# Patient Record
Sex: MALE | Race: WHITE
[De-identification: names, ages, dates, MRNs, and addresses within clinical notes are randomized per-mention and may not be internally consistent; named-entity substitution may affect disease eponyms.]

---

## 2020-01-28 LAB
ANISOCYTOSIS BLD QL: (no result)
BLD SMEAR INTERP: (no result)
BUN BLD-MCNC: 18 MG/DL (ref 7–18)
GLUCOSE SERPLBLD-MCNC: 94 MG/DL (ref 74–106)
HCT VFR BLD CALC: 37.7 % (ref 39.6–49)
LYMPHOCYTES # SPEC AUTO: 3 K/UL (ref 0.7–4.9)
MORPHOLOGY BLD-IMP: (no result)
PMV BLD: 9 FL (ref 7.6–11.3)
POIKILOCYTOSIS BLD QL SMEAR: (no result)
POTASSIUM SERPL-SCNC: 4.4 MMOL/L (ref 3.5–5.1)
RBC # BLD: 5.02 M/UL (ref 4.33–5.43)

## 2020-01-28 NOTE — RAD REPORT
EXAM DESCRIPTION:  RAD - Chest Pa And Lat (2 Views) - 1/28/2020 4:33 pm

 

CLINICAL HISTORY:  preop, patient pending hernia repair, prior colon resection, smoking history

 

COMPARISON:  CHEST PA AND LAT 2 VIEW dated 1/19/2012

 

TECHNIQUE:  Frontal and lateral views of the chest were obtained.

 

FINDINGS:  The lungs are clear of a focal mass or infiltrate. Interstitial pattern matches comparison
.   Heart size is normal and central vasculature is within normal limits.  No pleural effusion or pne
umothorax seen.  No acute bony finding noted.  No aortic abnormality.

 

IMPRESSION:  No acute cardiopulmonary process.

## 2020-01-29 ENCOUNTER — HOSPITAL ENCOUNTER (INPATIENT)
Dept: HOSPITAL 97 - OR | Age: 60
LOS: 2 days | Discharge: HOME | DRG: 337 | End: 2020-01-31
Attending: SURGERY | Admitting: SURGERY
Payer: COMMERCIAL

## 2020-01-29 VITALS — BODY MASS INDEX: 40.6 KG/M2

## 2020-01-29 DIAGNOSIS — K43.0: Primary | ICD-10-CM

## 2020-01-29 DIAGNOSIS — F17.210: ICD-10-CM

## 2020-01-29 DIAGNOSIS — K66.0: ICD-10-CM

## 2020-01-29 DIAGNOSIS — Z98.84: ICD-10-CM

## 2020-01-29 PROCEDURE — 97116 GAIT TRAINING THERAPY: CPT

## 2020-01-29 PROCEDURE — 85025 COMPLETE CBC W/AUTO DIFF WBC: CPT

## 2020-01-29 PROCEDURE — 0WUF0JZ SUPPLEMENT ABDOMINAL WALL WITH SYNTHETIC SUBSTITUTE, OPEN APPROACH: ICD-10-PCS

## 2020-01-29 PROCEDURE — 0DN80ZZ RELEASE SMALL INTESTINE, OPEN APPROACH: ICD-10-PCS

## 2020-01-29 PROCEDURE — 80048 BASIC METABOLIC PNL TOTAL CA: CPT

## 2020-01-29 PROCEDURE — 71046 X-RAY EXAM CHEST 2 VIEWS: CPT

## 2020-01-29 PROCEDURE — 36415 COLL VENOUS BLD VENIPUNCTURE: CPT

## 2020-01-29 PROCEDURE — 88302 TISSUE EXAM BY PATHOLOGIST: CPT

## 2020-01-29 RX ADMIN — CEFOXITIN SODIUM SCH MLS: 1 INJECTION, SOLUTION INTRAVENOUS at 13:05

## 2020-01-29 RX ADMIN — SODIUM CHLORIDE SCH MLS: 0.9 INJECTION, SOLUTION INTRAVENOUS at 13:04

## 2020-01-29 RX ADMIN — HYDROMORPHONE HYDROCHLORIDE ONE MG: 2 INJECTION INTRAMUSCULAR; INTRAVENOUS; SUBCUTANEOUS at 12:34

## 2020-01-29 RX ADMIN — HYDROMORPHONE HYDROCHLORIDE ONE MG: 2 INJECTION INTRAMUSCULAR; INTRAVENOUS; SUBCUTANEOUS at 12:24

## 2020-01-29 RX ADMIN — SODIUM CHLORIDE SCH MLS: 0.9 INJECTION, SOLUTION INTRAVENOUS at 21:59

## 2020-01-29 RX ADMIN — HYDROMORPHONE HYDROCHLORIDE PRN MG: 1 INJECTION, SOLUTION INTRAMUSCULAR; INTRAVENOUS; SUBCUTANEOUS at 18:44

## 2020-01-29 RX ADMIN — CEFOXITIN SODIUM SCH MLS: 1 INJECTION, SOLUTION INTRAVENOUS at 18:43

## 2020-01-29 RX ADMIN — HYDROMORPHONE HYDROCHLORIDE PRN MG: 1 INJECTION, SOLUTION INTRAMUSCULAR; INTRAVENOUS; SUBCUTANEOUS at 22:06

## 2020-01-29 RX ADMIN — HYDROMORPHONE HYDROCHLORIDE ONE MG: 2 INJECTION INTRAMUSCULAR; INTRAVENOUS; SUBCUTANEOUS at 12:16

## 2020-01-29 RX ADMIN — HYDROMORPHONE HYDROCHLORIDE ONE MG: 2 INJECTION INTRAMUSCULAR; INTRAVENOUS; SUBCUTANEOUS at 12:19

## 2020-01-29 NOTE — P.BOP
Preoperative diagnosis: incisional ventral hernia, hx of ostomy


Postoperative diagnosis: same incarcerated incisional ventral hernia


Primary procedure: Exploratory laparotomy, Extensive intrabdominal adhesions


Secondary procedure: Open repair of large incisional ventral hernia with mesh 

lap assisted


Assistant: Kita Ferrera (Michelle)


Estimated blood loss: <50cc


Specimen: hernia sac


Findings: incarcerated omentum


Anesthesia: General


Complications: None


Implants: ventralight ST with echo ps 15.2 x 20.3cm


Transferred to: Recovery Room


Condition: Good

## 2020-01-30 VITALS — OXYGEN SATURATION: 92 %

## 2020-01-30 LAB
BUN BLD-MCNC: 14 MG/DL (ref 7–18)
GLUCOSE SERPLBLD-MCNC: 100 MG/DL (ref 74–106)
HCT VFR BLD CALC: 32.8 % (ref 39.6–49)
LYMPHOCYTES # SPEC AUTO: 1.7 K/UL (ref 0.7–4.9)
PMV BLD: 9.2 FL (ref 7.6–11.3)
POTASSIUM SERPL-SCNC: 4.2 MMOL/L (ref 3.5–5.1)
RBC # BLD: 4.36 M/UL (ref 4.33–5.43)

## 2020-01-30 RX ADMIN — HYDROCODONE BITARTRATE AND ACETAMINOPHEN PRN TAB: 7.5; 325 TABLET ORAL at 12:06

## 2020-01-30 RX ADMIN — CEFOXITIN SODIUM SCH MLS: 1 INJECTION, SOLUTION INTRAVENOUS at 00:54

## 2020-01-30 RX ADMIN — HYDROMORPHONE HYDROCHLORIDE PRN MG: 1 INJECTION, SOLUTION INTRAMUSCULAR; INTRAVENOUS; SUBCUTANEOUS at 17:02

## 2020-01-30 RX ADMIN — HYDROMORPHONE HYDROCHLORIDE PRN MG: 1 INJECTION, SOLUTION INTRAMUSCULAR; INTRAVENOUS; SUBCUTANEOUS at 13:56

## 2020-01-30 RX ADMIN — HYDROMORPHONE HYDROCHLORIDE PRN MG: 1 INJECTION, SOLUTION INTRAMUSCULAR; INTRAVENOUS; SUBCUTANEOUS at 09:22

## 2020-01-30 RX ADMIN — HYDROMORPHONE HYDROCHLORIDE PRN MG: 1 INJECTION, SOLUTION INTRAMUSCULAR; INTRAVENOUS; SUBCUTANEOUS at 01:03

## 2020-01-30 RX ADMIN — HYDROCODONE BITARTRATE AND ACETAMINOPHEN PRN TAB: 7.5; 325 TABLET ORAL at 06:30

## 2020-01-30 RX ADMIN — SODIUM CHLORIDE SCH: 0.9 INJECTION, SOLUTION INTRAVENOUS at 18:00

## 2020-01-30 RX ADMIN — HYDROMORPHONE HYDROCHLORIDE PRN MG: 1 INJECTION, SOLUTION INTRAMUSCULAR; INTRAVENOUS; SUBCUTANEOUS at 05:18

## 2020-01-30 RX ADMIN — SODIUM CHLORIDE SCH MLS: 0.9 INJECTION, SOLUTION INTRAVENOUS at 08:00

## 2020-01-30 RX ADMIN — SODIUM CHLORIDE SCH MG: 0.9 INJECTION, SOLUTION INTRAVENOUS at 09:14

## 2020-01-30 RX ADMIN — SODIUM CHLORIDE SCH MLS: 0.9 INJECTION, SOLUTION INTRAVENOUS at 20:28

## 2020-01-30 RX ADMIN — HYDROCODONE BITARTRATE AND ACETAMINOPHEN PRN TAB: 7.5; 325 TABLET ORAL at 20:35

## 2020-01-30 NOTE — PN
Date of Progress Note:  01/30/2020



Subjective:  Status post laparotomy and open repair of an incisional incarcerated ventral hernia with
 mesh __________ lysis of adhesions.  Patient is doing better.  Still not tolerating diet.  _________
_ ambulate.  Afebrile.  No shortness of breath.  No chest pain.



Objective:  Chest:  Clear. 

Abdomen:  Intact surgical site. 

Extremities:  Good capillary refill.  No calf tenderness.



Plan:  Continue pain control __________ requiring IV medication, abdominal binder, ambulation with PT
, incentive spirometry.  We are going to advanced diet slowly.





HM/MODL

DD:  01/30/2020 11:44:18Voice ID:  [id number]

DT:  01/30/2020 16:02:32Report ID:  [id number]

## 2020-01-31 VITALS — SYSTOLIC BLOOD PRESSURE: 140 MMHG | DIASTOLIC BLOOD PRESSURE: 78 MMHG

## 2020-01-31 VITALS — TEMPERATURE: 99 F

## 2020-01-31 RX ADMIN — HYDROCODONE BITARTRATE AND ACETAMINOPHEN PRN TAB: 7.5; 325 TABLET ORAL at 08:42

## 2020-01-31 RX ADMIN — SODIUM CHLORIDE SCH MG: 0.9 INJECTION, SOLUTION INTRAVENOUS at 08:43

## 2020-01-31 RX ADMIN — HYDROCODONE BITARTRATE AND ACETAMINOPHEN PRN TAB: 7.5; 325 TABLET ORAL at 12:23

## 2020-01-31 RX ADMIN — SODIUM CHLORIDE SCH MLS: 0.9 INJECTION, SOLUTION INTRAVENOUS at 06:52

## 2020-01-31 RX ADMIN — HYDROCODONE BITARTRATE AND ACETAMINOPHEN PRN TAB: 7.5; 325 TABLET ORAL at 03:31

## 2020-01-31 NOTE — P.DS
Admission Date: 01/30/20


Discharge Date: 01/31/20


Disposition: ROUTINE DISCHARGE


Discharge Condition: GOOD


Vital Signs/Physical Exam: 














Temp Pulse Resp BP Pulse Ox


 


 99.4 F   66   18   134/78   98 


 


 01/31/20 08:00  01/31/20 08:00  01/31/20 08:42  01/31/20 08:00  01/31/20 08:42








General: Alert, Oriented x3, Cooperative


HEENT: PERRLA, EOMI


Neck: Supple


Respiratory: Normal air movement


Cardiovascular: No edema


Gastrointestinal: Soft and benign


Musculoskeletal: No erythema, No tenderness, No warmth


Integumentary: No rashes, No breakdown


Neurological: Normal speech


Laboratory Data at Discharge: 














WBC  7.4 K/uL (4.3-10.9)   01/30/20  05:06    


 


Hgb  10.6 g/dL (13.6-17.9)  L  01/30/20  05:06    


 


Hct  32.8 % (39.6-49.0)  L  01/30/20  05:06    


 


Plt Count  204 K/uL (152-406)   01/30/20  05:06    


 


Sodium  139 mmol/L (136-145)   01/30/20  05:06    


 


Potassium  4.2 mmol/L (3.5-5.1)   01/30/20  05:06    


 


BUN  14 mg/dL (7-18)   01/30/20  05:06    


 


Creatinine  0.97 mg/dL (0.55-1.3)   01/30/20  05:06    


 


Glucose  100 mg/dL ()   01/30/20  05:06    








Home Medications: 








Apixaban [Eliquis] 5 mg PO BID 01/28/20 


Cholecalciferol (Vitamin D3) [Vitamin D3] 1,000 unit PO DAILY 01/28/20 


Cyanocobalamin (Vitamin B-12) [Vitamin B12] 2,500 mcg PO DAILY 01/28/20 


L.acidoph,Paracasei, B.lactis [Probiotic] 2 tab PO DAILY 01/28/20 


Magnesium Oxide [Mag 0X Tab] 400 mg PO DAILY 01/28/20 


Hydrocodone 7.5/APAP 325 [Norco 7.5/325 mg*] 1 tab PO Q4H PRN #30 tab 01/31/20 


Ondansetron HCl [Zofran] 4 mg PO Q6H PRN #10 tablet 01/31/20 


Sulfamethoxazole/Trimethoprim [Bactrim Ds Tablet] 1 each PO BID #10 tablet 01/31 /20 





New Medications: 


Hydrocodone 7.5/APAP 325 [Norco 7.5/325 mg*] 1 tab PO Q4H PRN #30 tab


 PRN Reason: Pain Scale 5-7 (Moderate)


Ondansetron HCl [Zofran] 4 mg PO Q6H PRN #10 tablet


 PRN Reason: Nausea / Vomiting


Sulfamethoxazole/Trimethoprim [Bactrim Ds Tablet] 1 each PO BID #10 tablet


Patient Discharge Instructions: MAy take showers with dressing off.  Abdominal 

binder


Diet: AHA


Activity: No lifting more than 10 lbs


Followup: 


Abundio Talley MD [ACTIVE - CAN ADMIT] - 1 Week

## 2020-02-01 NOTE — OP
Date of Procedure:  01/29/2020



Surgeon:  Abundio Talley MD



Assistant:  THOMAS Marino.



Preoperative Diagnoses:  Incisional ventral hernia, history of bowel resection and complicated surger
y with history of ileostomy.



Postoperative Diagnoses:  Incarcerated incisional ventral hernia, history of bowel resection and comp
licated surgery with history of ileostomy.



Procedures:  Exploratory laparotomy, extensive intra-abdominal adhesions, open repair of large incisi
onal ventral hernia with mesh, laparoscopic assist.



Specimen:  Hernia sac.



Findings:  Incarcerated omentum.  Large ventral defect.



Anesthesia:  General plus local.



Indications:  This is the case of a 59-year-old patient, who had extensive intra-abdominal surgeries 
with multiple complication as per patient.  They were not done in this institution.  He ended up with
 also a right side loop ileostomy that eventually was reversed by his doctors.  Now, he comes with a 
hernia on the ostomy site, tender.  He wants that repaired.  The benefit, alternatives, risks of repa
ir of the incisional large ventral hernia with mesh with laparoscopic assist was fully explained to t
he patient, which include, but are not limited to infection, bleeding, damage to adjacent structures,
 anesthesia complication, recurrence, MI, and even death.  He also understands this may not relieve a
ny symptoms, he might need more than one surgical intervention.  He understands the importance of los
ing weight and avoiding trauma to the area.  He is understanding we will have intention to use mesh i
n this place if needed and mesh pros and cons were explained to the patient with all the questions an
swered to his satisfaction and he did consent for mesh.  We also explained to him we may encounter so
me adhesions that area; that if extensive, may have to stay overnight if that is the case and may nee
d to do a formal laparotomy.  He understood all that.  Under those conditions, then he signed the con
sent.



Description Of Procedure:  Patient was brought to the operating room, placed in supine position.  Ane
sthesia was achieved without complication.  Abdominal area was prepped and draped in sterile fashion.
  Local anesthesia was applied.  We made an incision over the previous area what we felt the hernia. 
 We were able to go all the way down to find the hernia sac.  We were able to notice the defects pres
ent.  Once we had a Sruthi trocar over the area, we obtained the pneumoperitoneum.  We noticed the ex
tensive intra-abdominal adhesions.  In order for me to continue to be able to repair this, I had to d
o 2 things.  I had to close the defect and most likely it was going to have to do open since the fasc
ial edges had to be  and had to be cleaned, but in fact removed all this adhesions from that
 area and dropped the bowels that was attached to the anterior abdominal wall down without injury.  T
hen, we proceeded with the surgery and that is what we did.  We put 5 mm trocars in the sites of the 
abdomen and carefully we did extensive lysis of adhesions.  It took about an hour just to do adhesion
 itself.  This was done with the help of LigaSure with no enterotomies.  Once we had the specific siz
e of the defect, we saw clearly now.  Then, I proceeded to open skin where that area was and then rem
jj the hernia sac, cleaned the fascial edges.  We reduced the subcutaneous tissue with scar tissue 
from around the area to allowed me to put this fascia together, but we had to use a mesh underneath t
o reinforce and that will be done laparoscopic again.  So, I cleaned the fascial edges, removed the h
ernia sac, removed the omentum previously from that area, and then I proceeded to close the defects w
ith nylon stitches figure-of-eight interrupted.  Once we had all them together before we tied those, 
we dropped a Ventralight ST mesh 15 x 20 cm to at least overlap the defect by about 3 to 5 cm.  Then,
 we tied all the stitches except 1 so we do not entrap the inflation tubing.  At that moment, we obta
ined pneumoperitoneum once again.  Checked the area of the previous lysis of adhesions, no enterotomi
es, no bleeding.  Omental was intact.  At that moment, then I proceeded to insufflate the balloon and
 we indeed when we did that the mesh looked nice and flat against the abdominal wall with no bowel in
 between.  We used a capsule fixation device to secure that area.  We used at least 2 of those device
s just to secure that in place and make sure there is no intestines coming in between.  We noticed al
so that already we had airproof conditions since no air did not come through meaning the fascia was w
ell approximated.  After we removed the balloon and then we inspected the area again, we deflated the
 abdomen under direct visualization and removed the trocars.  We tied the last stitches on the fascia
, then closed the subcutaneous tissues with 3-0 chromic, and then the skin approximated.  The sponge 
count and instrument counts were correct.  I put an abdominal binder in the area.  Since the patient 
has extensive intra-abdominal adhesions and the fascial edges have to be brought together, we expect 
pain and ileus in this patient.  So, he is going to be kept overnight as an inpatient; and then whene
sara we can control his pain with p.o. medication and he tolerates his diet, then he will be able to d
ischarge home in the next 24 to 48 hours.





JOSE/SULEMA

DD:  01/31/2020 13:15:01Voice ID:  [id number]

DT:  02/01/2020 00:05:54Report ID:  [id number]